# Patient Record
Sex: MALE | URBAN - METROPOLITAN AREA
[De-identification: names, ages, dates, MRNs, and addresses within clinical notes are randomized per-mention and may not be internally consistent; named-entity substitution may affect disease eponyms.]

---

## 2023-11-01 ENCOUNTER — APPOINTMENT (OUTPATIENT)
Dept: URBAN - METROPOLITAN AREA CLINIC 204 | Age: 68
Setting detail: DERMATOLOGY
End: 2023-11-02

## 2023-11-01 PROCEDURE — 17000 DESTRUCT PREMALG LESION: CPT

## 2023-11-01 PROCEDURE — OTHER MIPS QUALITY: OTHER

## 2023-11-01 PROCEDURE — 99213 OFFICE O/P EST LOW 20 MIN: CPT | Mod: 25

## 2023-11-01 PROCEDURE — 17003 DESTRUCT PREMALG LES 2-14: CPT

## 2025-01-20 ENCOUNTER — EVALUATION (OUTPATIENT)
Dept: PHYSICAL THERAPY | Facility: CLINIC | Age: 70
End: 2025-01-20
Payer: MEDICARE

## 2025-01-20 DIAGNOSIS — S83.91XA SPRAIN OF UNSPECIFIED SITE OF RIGHT KNEE, INITIAL ENCOUNTER: ICD-10-CM

## 2025-01-20 PROCEDURE — 97161 PT EVAL LOW COMPLEX 20 MIN: CPT | Mod: GP

## 2025-01-20 PROCEDURE — 97110 THERAPEUTIC EXERCISES: CPT | Mod: GP

## 2025-01-20 ASSESSMENT — PAIN SCALES - GENERAL: PAINLEVEL_OUTOF10: 2

## 2025-01-20 ASSESSMENT — ENCOUNTER SYMPTOMS
LOSS OF SENSATION IN FEET: 0
DEPRESSION: 0
OCCASIONAL FEELINGS OF UNSTEADINESS: 0

## 2025-01-20 ASSESSMENT — PATIENT HEALTH QUESTIONNAIRE - PHQ9
SUM OF ALL RESPONSES TO PHQ9 QUESTIONS 1 AND 2: 0
1. LITTLE INTEREST OR PLEASURE IN DOING THINGS: NOT AT ALL
2. FEELING DOWN, DEPRESSED OR HOPELESS: NOT AT ALL

## 2025-01-20 ASSESSMENT — ACTIVITIES OF DAILY LIVING (ADL): EFFECT OF PAIN ON DAILY ACTIVITIES: SEE GOALS

## 2025-01-20 ASSESSMENT — PAIN - FUNCTIONAL ASSESSMENT: PAIN_FUNCTIONAL_ASSESSMENT: 0-10

## 2025-01-20 NOTE — Clinical Note
2025    David Echeverria MD  [address] [facility] [address]    Patient: Stefany Mullen   YOB: 1955   Date of Visit: 2025       Dear David Echeverria MD  [address] [facility] [place] [address]    The attached plan of care is being sent to you because your patient’s medical reimbursement requires that you certify the plan of care. Your signature is required to allow uninterrupted insurance coverage.      You may indicate your approval by signing below and faxing this form back to us at Dept Fax: [phone].    Please call Dept: [phone] with any questions or concerns.    Thank you for this referral,        Americo Hodges, PT  [address] [address]    Payer: Payor: MEDICARE / Plan: MEDICARE PART A AND B / Product Type: *No Product type* /                                                                         Date:     Dear Americo Hodges, PT,     Re: Mr. Stefany Mullen, MRN:[id number]    I certify that I have reviewed the attached plan of care and it is medically necessary for Mr. Stefany Mullen (1955) who is under my care.          ______________________________________                    _________________  Provider name and credentials                                           Date and time                                                                                           Plan of Care 25   Effective from: 2025  Effective to: 2025    Plan ID: [id number]            Participants as of Finalize on 2025    Name Type Comments Contact Info    David Echeverria MD Referring Provider  [phone]    Americo Hodges PT Physical Therapist  [phone]       Last Plan Note     Author: Americo Hodges PT Status: Signed Last edited: 2025  7:00 AM       Patient Name: Stefany Mullen  MRN: [id number]  Today's Date: 2025  : 1955  David Echeverria  Time Calculation  Start  Time: 0704  Stop Time: 0745  Time Calculation (min): 41 min    PT Evaluation Time Entry  PT Evaluation (Low) Time Entry: 30   PT Therapeutic Procedures Time Entry  Therapeutic Exercise Time Entry: 12                         Assessment:  Rehab Prognosis: Good  Barriers to Participation:  (none)    Plan:  Treatment/Interventions: Aquatic therapy, Cryotherapy, Education/ Instruction, Electrical stimulation, Gait training, Hot pack, Manual therapy, Self care/ home management, Therapeutic exercises, Other (comment) (IASTM/cupping)  PT Plan: Skilled PT  PT Frequency: 2 times per week  Duration: 4wks  Onset Date: 10/09/24  Certification Period Start Date: 01/20/25  Certification Period End Date: 04/20/25  Rehab Potential: Good  Plan of Care Agreement: Patient    Current Problem:   1. 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    Physical Findings*: Limited: AROM ( R knee )                                                Strength ( R knee )                                                  POC:  Ongoing clinic PT to include: continue with open to closed chain R knee ROM?PRE as amanda    Other: (copay*?- no ) (fall risk*?-  no ) (ins visit limit*?-no   )      Precautions:  Precautions  STEADI Fall Risk Score (The score of 4 or more indicates an increased risk of falling): 1  Precautions Comment: R/L TKR (last in 2020)    Pain:  Pain Assessment  Pain Assessment: 0-10  0-10 (Numeric) Pain Score: 2  Pain Location: Knee  Pain Orientation: Right  Pain Onset:  (10/9/24 injury while walking on  "stairs)  Effect of Pain on Daily Activities: see goals  Pain Interventions:  (flms taken)    Prior Level of Function:  Prior Function Per Pt/Caregiver Report  Vocational: Retired    Objective     Outcome Measures:  Other Measures  Lower Extremity Funtional Score (LEFS): 45     Treatments:  L 1/2 heel lift application  R stdg TKE10 vy  POC:  Ongoing clinic PT to include: continue with open to closed chain R knee ROM?PRE as amanda  OP EDUCATION:  Heel lift  Access Code: [email or web address]  URL: [email or web address]  Date: 01/20/2025  Prepared by: Vj Hodges    Exercises  - Standing Terminal Knee Extension with Resistance   Goals:  Active       PT Problem       PT Goal 1       Start:  01/20/25    Expected End:  04/20/25       1. Independent HEP to allow for 50% reduction in max ADL C/C sx ( 2/10) 2-3wks  2. Survey score improvement from 45/80 to 55/80 (LEFS) 3-4wks  3. ROM increase to allow for improved ADL stairs (from 5-110 to neut to 120 R knee) 3-4wks           PT Goal 2       Start:  01/20/25    Expected End:  04/20/25       1. \"I want my  knee  to feel better\".              KNEE        Knee Observation  Observation Comment: R>L leg length         Knee AROM WFL unless documented below  R knee flexion: (140°): 110 (pain)  L knee flexion: (140°): 120  R knee extension: (0°): -5  L knee extension: (0°): neut  Lower Extremity Strength:WFL unless documented  MMT 5/5 max  RIGHT LEFT   Hip Flexion    5 /5    5 /5   Hip Extension    5 /5    5 /5   Hip Abduction     5/5    5 /5        Knee Extension    5 /5     5/5   Knee Flexion     5/5     5/5                                           Current Participants as of 1/20/2025    Name Type Comments Contact Info    David Echeverria MD Referring Provider  [phone]    Signature pending    Americo Hodges, PT Physical Therapist  [phone]          "

## 2025-01-20 NOTE — PROGRESS NOTES
Patient Name: Stefany Mullen  MRN: [id number]  Today's Date: 2025  : 1955  David Echeverria  Time Calculation  Start Time: 704  Stop Time: 745  Time Calculation (min): 41 min    PT Evaluation Time Entry  PT Evaluation (Low) Time Entry: 30   PT Therapeutic Procedures Time Entry  Therapeutic Exercise Time Entry: 12                         Assessment:  Rehab Prognosis: Good  Barriers to Participation:  (none)    Plan:  Treatment/Interventions: Aquatic therapy, Cryotherapy, Education/ Instruction, Electrical stimulation, Gait training, Hot pack, Manual therapy, Self care/ home management, Therapeutic exercises, Other (comment) (IASTM/cupping)  PT Plan: Skilled PT  PT Frequency: 2 times per week  Duration: 4wks  Onset Date: 10/09/24  Certification Period Start Date: 25  Certification Period End Date: 25  Rehab Potential: Good  Plan of Care Agreement: Patient    Current Problem:   1. 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    Physical Findings*: Limited: AROM ( R knee )                                                Strength ( R knee )                                                  POC:  Ongoing clinic PT to include: continue with open to closed chain R knee ROM?PRE as amanda    Other: (copay*?- no ) (fall risk*?-  no ) (ins visit limit*?-no   )      Precautions:  Precautions  STEADI Fall Risk Score (The score of 4 or more indicates an increased risk of falling): 1  Precautions Comment: R/L TKR (last in )    Pain:  Pain Assessment  Pain Assessment:  "0-10  0-10 (Numeric) Pain Score: 2  Pain Location: Knee  Pain Orientation: Right  Pain Onset:  (10/9/24 injury while walking on stairs)  Effect of Pain on Daily Activities: see goals  Pain Interventions:  (flms taken)    Prior Level of Function:  Prior Function Per Pt/Caregiver Report  Vocational: Retired    Objective     Outcome Measures:  Other Measures  Lower Extremity Funtional Score (LEFS): 45     Treatments:  L 1/2 heel lift application  R stdg TKE10 vy  POC:  Ongoing clinic PT to include: continue with open to closed chain R knee ROM?PRE as amanda  OP EDUCATION:  Heel lift  Access Code: [email or web address]  URL: [email or web address]  Date: 01/20/2025  Prepared by: Vj Hodges    Exercises  - Standing Terminal Knee Extension with Resistance   Goals:  Active       PT Problem       PT Goal 1       Start:  01/20/25    Expected End:  04/20/25       1. Independent HEP to allow for 50% reduction in max ADL C/C sx ( 2/10) 2-3wks  2. Survey score improvement from 45/80 to 55/80 (LEFS) 3-4wks  3. ROM increase to allow for improved ADL stairs (from 5-110 to neut to 120 R knee) 3-4wks           PT Goal 2       Start:  01/20/25    Expected End:  04/20/25       1. \"I want my  knee  to feel better\".              KNEE        Knee Observation  Observation Comment: R>L leg length         Knee AROM WFL unless documented below  R knee flexion: (140°): 110 (pain)  L knee flexion: (140°): 120  R knee extension: (0°): -5  L knee extension: (0°): neut  Lower Extremity Strength:WFL unless documented  MMT 5/5 max  RIGHT LEFT   Hip Flexion    5 /5    5 /5   Hip Extension    5 /5    5 /5   Hip Abduction     5/5    5 /5        Knee Extension    5 /5     5/5   Knee Flexion     5/5     5/5                                    "

## 2025-01-22 ENCOUNTER — TREATMENT (OUTPATIENT)
Dept: PHYSICAL THERAPY | Facility: CLINIC | Age: 70
End: 2025-01-22
Payer: MEDICARE

## 2025-01-22 DIAGNOSIS — S83.91XA SPRAIN OF UNSPECIFIED SITE OF RIGHT KNEE, INITIAL ENCOUNTER: ICD-10-CM

## 2025-01-22 PROCEDURE — 97140 MANUAL THERAPY 1/> REGIONS: CPT | Mod: GP,CQ

## 2025-01-22 PROCEDURE — 97110 THERAPEUTIC EXERCISES: CPT | Mod: GP,CQ

## 2025-01-22 ASSESSMENT — PAIN SCALES - GENERAL: PAINLEVEL_OUTOF10: 4

## 2025-01-22 ASSESSMENT — PAIN - FUNCTIONAL ASSESSMENT: PAIN_FUNCTIONAL_ASSESSMENT: 0-10

## 2025-01-22 NOTE — PROGRESS NOTES
Physical Therapy Treatment    Patient Name: Stefany Mullen  MRN: [id number]  Today's Date: 1/22/2025  Time Calculation  Start Time: 0745  Stop Time: 0828  Time Calculation (min): 43 min  PT Therapeutic Procedures Time Entry  Manual Therapy Time Entry: 15  Therapeutic Exercise Time Entry: 26       Assessment:   Patient able to tolerate new PRE's with good form and mild challenges noted. Patient tolerates seated resisted exercises with no exacerbation of symptoms. Demo's restriction in R IT band, but responds well to STM. Patient demo's improvements in symptoms pre and post session.    Plan:  Continue to work on improving strength and decreasing pain to be able to tolerate prolonged standing with little to no difficulty. -AB.     OP PT Plan  Treatment/Interventions: Aquatic therapy, Cryotherapy, Education/ Instruction, Electrical stimulation, Gait training, Hot pack, Manual therapy, Self care/ home management, Therapeutic exercises, Other (comment) (IASTM/cupping)  PT Plan: Skilled PT  PT Frequency: 2 times per week  Duration: 4wks  Onset Date: 10/09/24  Certification Period Start Date: 01/20/25  Certification Period End Date: 04/20/25  Rehab Potential: Good  Plan of Care Agreement: Patient    Current Problem  Problem List Items Addressed This Visit             ICD-10-CM    Sprain of unspecified site of right knee, initial encounter S83.91XA       Subjective   General  Reason for Referral: R knee sprain  Referred By: Juwan  General Comment: 2/9  Visit: 2  HEP: Yes  Patient states that he isn't sure if the exercise that he is doing is putting pressure on his knee or not. States that he is having pain around the knee cap. Patient states that he got a heel lift at his last appt and states that he took it apart and is using a smaller lift for now until his body can adjust.     Precautions  Precautions  Precautions Comment: R/L TKR (last in 2020)    Pain  Pain Assessment: 0-10  0-10 (Numeric) Pain Score: 4  Pain Location:  "Knee  Pain Orientation: Right    Objective     Treatments:  Therapeutic Exercise: 26 minutes, 2 units  SciFit x5' (N)  Slantboard 2x1' (N)  Step ups 6\" step 2x10 R leading Fwd/Lateral (N)  Standing heel raises 2x10 Bilat (N)  Seated LAQ with ball squeeze 2x10 R (N)  Seated hip abduction 2x10 [place] Tband (N)  Seated marches 2x10 [place] Tband (N)  L 1/2 heel lift application (X)  R stdg TKE10 vy    Manual Therapy: 15 minutes, 1 units  STM to R distal IT band, quad, HS (N)    POC:  Ongoing clinic PT to include: continue with open to closed chain R knee ROM?PRE as amanda    OP EDUCATION:   Heel lift  Access Code: [email or web address]  URL: [email or web address]  Date: 01/20/2025  Prepared by: Vj Hodges    Exercises  - Standing Terminal Knee Extension with Resistance     Goals:  Active       PT Problem       PT Goal 1       Start:  01/20/25    Expected End:  04/20/25       1. Independent HEP to allow for 50% reduction in max ADL C/C sx ( 2/10) 2-3wks  2. Survey score improvement from 45/80 to 55/80 (LEFS) 3-4wks  3. ROM increase to allow for improved ADL stairs (from 5-110 to neut to 120 R knee) 3-4wks           PT Goal 2       Start:  01/20/25    Expected End:  04/20/25       1. \"I want my  knee  to feel better\".             "

## 2025-01-27 ENCOUNTER — TREATMENT (OUTPATIENT)
Dept: PHYSICAL THERAPY | Facility: CLINIC | Age: 70
End: 2025-01-27
Payer: MEDICARE

## 2025-01-27 DIAGNOSIS — S83.91XA SPRAIN OF UNSPECIFIED SITE OF RIGHT KNEE, INITIAL ENCOUNTER: ICD-10-CM

## 2025-01-27 PROCEDURE — 97140 MANUAL THERAPY 1/> REGIONS: CPT | Mod: GP,CQ

## 2025-01-27 PROCEDURE — 97110 THERAPEUTIC EXERCISES: CPT | Mod: GP,CQ

## 2025-01-27 ASSESSMENT — PAIN - FUNCTIONAL ASSESSMENT: PAIN_FUNCTIONAL_ASSESSMENT: 0-10

## 2025-01-27 ASSESSMENT — PAIN SCALES - GENERAL: PAINLEVEL_OUTOF10: 2

## 2025-01-27 NOTE — PROGRESS NOTES
Physical Therapy Treatment    Patient Name: Stefany Mullen  MRN: [id number]  Today's Date: 1/27/2025  Time Calculation  Start Time: 0658  Stop Time: 0743  Time Calculation (min): 45 min  PT Therapeutic Procedures Time Entry  Manual Therapy Time Entry: 12  Therapeutic Exercise Time Entry: 31       Assessment:   Patient identified by name and date of birth. Patient was able to progress with ROM and strengthening w/o increased Sx. He presented with palpable tension in R IT and HS added cupping this date patient demonstrated good tolerance with reduction of Sx noted after.     OBJECTIVE   Patient was able to perform previously issued exercise for HEP with 25% cueing for correct technique.   Plan:  OP PT Plan  Treatment/Interventions: Aquatic therapy, Cryotherapy, Education/ Instruction, Electrical stimulation, Gait training, Hot pack, Manual therapy, Self care/ home management, Therapeutic exercises, Other (comment) (IASTM/cupping)  PT Plan: Skilled PT  PT Frequency: 2 times per week  Duration: 4wks  Onset Date: 10/09/24  Certification Period Start Date: 01/20/25  Certification Period End Date: 04/20/25  Rehab Potential: Good  Plan of Care Agreement: Patient  Continue with ROM and strengthening of R knee with manual as needed for reduction of Sx and increased ease with ambulation with job duties.   Current Problem  Problem List Items Addressed This Visit             ICD-10-CM    Sprain of unspecified site of right knee, initial encounter S83.91XA       Subjective Patient reported compliance with % of the time and verbalized understanding. He reported he experienced sharp pain in his knee and the lateral side of his LE. He reported no pain with standing that increased with ambulation out of clinic area.   General  Reason for Referral: R knee sprain  Referred By: Juwan  General Comment: 3/9  Precautions  Precautions  Precautions Comment: R/L TKR (last in 2020)    Pain  Pain Assessment: 0-10  0-10 (Numeric) Pain Score:  "2  Pain Location: Knee  Pain Orientation: Right     Treatments:  Therapeutic Exercise  Therapeutic Exercise Performed: Yes  SciFit x5'   Slantboard 2x1'   Step ups 6\" step 2x10 R leading Fwd/Lateral   Standing heel raises 2x10 Bilat   Standing hip abd,hip ext 2 x 10 (N)  R stdg TKE 2 x 10 blue  Seated LAQ with ball squeeze 2x10 R   Seated hip abduction 2x10 blue Tband   Seated marches 2x10  blue Tband   Seated HSC with blue band 2 x 10 (N)  Seated HS stretch 2 x 20\" (N)  IT band stretch with strap x5 10\" hold (N)  L 1/2 heel lift application (X)     Manual Therapy:   STM to R distal IT band, quad, HS  Cupping to R IT band static and dynamic (N)      OP EDUCATION:  Access Code: [email or web address]  URL: [email or web address]  Date: 01/27/2025  Prepared by: Chante Ramirez    Exercises  - Seated Long Arc Quad with Hip Adduction  - 1 x daily - 7 x weekly - 2 sets - 10 reps  - Seated Hip Abduction with Resistance  - 1 x daily - 7 x weekly - 2 sets - 10 reps  - Seated March with Resistance  - 1 x daily - 7 x weekly - 2 sets - 10 reps  - Seated Hamstring Stretch  - 1 x daily - 7 x weekly - 1 sets - 3 reps - 20-30 hold  - Supine ITB Stretch with Strap  - 1 x daily - 7 x weekly - 1 sets - 10 reps - 10 hold*   Heel lift  Access Code: [email or web address]  URL: [email or web address]  Date: 01/20/2025  Prepared by: Vj Hodges     Exercises  - Standing Terminal Knee Extension with Resistance     Goals:  Active       PT Problem       PT Goal 1       Start:  01/20/25    Expected End:  04/20/25       1. Independent HEP to allow for 50% reduction in max ADL C/C sx ( 2/10) 2-3wks  2. Survey score improvement from 45/80 to 55/80 (LEFS) 3-4wks  3. ROM increase to allow for improved ADL stairs (from 5-110 to neut to 120 R knee) 3-4wks           PT Goal 2       Start:  01/20/25    Expected End:  04/20/25       1. \"I want my  knee  to feel better\".             "

## 2025-01-29 ENCOUNTER — TREATMENT (OUTPATIENT)
Dept: PHYSICAL THERAPY | Facility: CLINIC | Age: 70
End: 2025-01-29
Payer: MEDICARE

## 2025-01-29 DIAGNOSIS — S83.91XA SPRAIN OF UNSPECIFIED SITE OF RIGHT KNEE, INITIAL ENCOUNTER: ICD-10-CM

## 2025-01-29 PROCEDURE — 97110 THERAPEUTIC EXERCISES: CPT | Mod: GP,CQ

## 2025-01-29 PROCEDURE — 97140 MANUAL THERAPY 1/> REGIONS: CPT | Mod: GP,CQ

## 2025-01-29 ASSESSMENT — PAIN SCALES - GENERAL: PAINLEVEL_OUTOF10: 5 - MODERATE PAIN

## 2025-01-29 ASSESSMENT — PAIN - FUNCTIONAL ASSESSMENT: PAIN_FUNCTIONAL_ASSESSMENT: 0-10

## 2025-01-29 NOTE — PROGRESS NOTES
"Physical Therapy Treatment    Patient Name: Stefany Mullen  MRN: [id number]  Today's Date: 1/29/2025  Time Calculation  Start Time: 0700  Stop Time: 0744  Time Calculation (min): 44 min  PT Therapeutic Procedures Time Entry  Manual Therapy Time Entry: 10  Therapeutic Exercise Time Entry: 30       Assessment:   Patient identified by name and date of birth. Patient was able to progress with strengthening this date with addition of mini squats. He presented with tension in IT band, instructed with use of roller for HEP he verbalized good understanding.     OBJECTIVE     Plan:  OP PT Plan  Treatment/Interventions: Aquatic therapy, Cryotherapy, Education/ Instruction, Electrical stimulation, Gait training, Hot pack, Manual therapy, Self care/ home management, Therapeutic exercises, Other (comment) (IASTM/cupping)  PT Plan: Skilled PT  PT Frequency: 2 times per week  Duration: 4wks  Onset Date: 10/09/24  Certification Period Start Date: 01/20/25  Certification Period End Date: 04/20/25  Rehab Potential: Good  Plan of Care Agreement: Patient  Continue with progression of ROM and strengthening to increase with standing and driving.   Current Problem  Problem List Items Addressed This Visit             ICD-10-CM    Sprain of unspecified site of right knee, initial encounter S83.91XA       Subjective Patient reported compliance with % of the time and verbalized understanding.  Patient reported he has experienced increased soreness the past two days. He reported 0/10 pain after treatment.   General  Reason for Referral: R knee sprain  Referred By: Juwan  General Comment: 4/9  Precautions  Precautions  Precautions Comment: R/L TKR (last in 2020)    Pain  Pain Assessment: 0-10  0-10 (Numeric) Pain Score: 5 - Moderate pain  Pain Location: Knee  Pain Orientation: Right     Treatments:  Therapeutic Exercise  Therapeutic Exercise Performed: Yes  SciFit x5'   Slantboard 2x1'   Step ups 6\" step 2x10 R leading Fwd/Lateral   Standing " "heel raises 2x10 Bilat   Standing hip abd,hip ext 2 x 10 on Airex one UE support  Mini Squats 2 x 10 (N)  R stdg TKE 2 x 10 blue  Seated LAQ with ball squeeze 2x10 R   Seated hip abduction 2x10 blue Tband   Seated marches 2x10  blue Tband   Seated HSC with blue band 2 x 10  Seated HS stretch 2 x 20\"   IT band stretch with strap x5 10\" hold   L 1/2 heel lift application (X)     Manual Therapy:   STM to R distal IT band, quad, HS  Cupping to R IT band static and dynamic (X)     OP EDUCATION:  Access Code: [email or web address]  URL: [email or web address]  Date: 01/27/2025  Prepared by: Chante Ramirez     Exercises  - Seated Long Arc Quad with Hip Adduction  - 1 x daily - 7 x weekly - 2 sets - 10 reps  - Seated Hip Abduction with Resistance  - 1 x daily - 7 x weekly - 2 sets - 10 reps  - Seated March with Resistance  - 1 x daily - 7 x weekly - 2 sets - 10 reps  - Seated Hamstring Stretch  - 1 x daily - 7 x weekly - 1 sets - 3 reps - 20-30 hold  - Supine ITB Stretch with Strap  - 1 x daily - 7 x weekly - 1 sets - 10 reps - 10 hold*   Heel lift  Access Code: [email or web address]  URL: [email or web address]  Date: 01/20/2025  Prepared by: Vj Hodges    Goals:  Active       PT Problem       PT Goal 1       Start:  01/20/25    Expected End:  04/20/25       1. Independent HEP to allow for 50% reduction in max ADL C/C sx ( 2/10) 2-3wks  2. Survey score improvement from 45/80 to 55/80 (LEFS) 3-4wks  3. ROM increase to allow for improved ADL stairs (from 5-110 to neut to 120 R knee) 3-4wks           PT Goal 2       Start:  01/20/25    Expected End:  04/20/25       1. \"I want my  knee  to feel better\".             "

## 2025-02-03 ENCOUNTER — TREATMENT (OUTPATIENT)
Dept: PHYSICAL THERAPY | Facility: CLINIC | Age: 70
End: 2025-02-03
Payer: MEDICARE

## 2025-02-03 DIAGNOSIS — S83.91XA SPRAIN OF UNSPECIFIED SITE OF RIGHT KNEE, INITIAL ENCOUNTER: ICD-10-CM

## 2025-02-03 PROCEDURE — 97110 THERAPEUTIC EXERCISES: CPT | Mod: GP,CQ

## 2025-02-03 PROCEDURE — 97140 MANUAL THERAPY 1/> REGIONS: CPT | Mod: GP,CQ

## 2025-02-03 ASSESSMENT — PAIN - FUNCTIONAL ASSESSMENT: PAIN_FUNCTIONAL_ASSESSMENT: 0-10

## 2025-02-03 ASSESSMENT — PAIN SCALES - GENERAL: PAINLEVEL_OUTOF10: 3

## 2025-02-03 NOTE — PROGRESS NOTES
"Physical Therapy Treatment    Patient Name: Stefany Mullen  MRN: [id number]  Today's Date: 2/3/2025  Time Calculation  Start Time: 0701  Stop Time: 0745  Time Calculation (min): 44 min  PT Therapeutic Procedures Time Entry  Manual Therapy Time Entry: 9  Therapeutic Exercise Time Entry: 33       Assessment:   Patient identified by name and date of birth. Reviewed patients report of increased Sx with PT he assessed patient, will proceed with treatment as tolerates. Held progression of reps/strengthening due to increased Sx after last treatment.     OBJECTIVE     Plan:  OP PT Plan  Treatment/Interventions: Aquatic therapy, Cryotherapy, Education/ Instruction, Electrical stimulation, Gait training, Hot pack, Manual therapy, Self care/ home management, Therapeutic exercises, Other (comment) (IASTM/cupping)  PT Plan: Skilled PT  PT Frequency: 2 times per week  Duration: 4wks  Onset Date: 10/09/24  Certification Period Start Date: 01/20/25  Certification Period End Date: 04/20/25  Rehab Potential: Good  Plan of Care Agreement: Patient  Progress with ROM and strengthening for increased ease with ambulation and sitting tolerance.   Current Problem  Problem List Items Addressed This Visit             ICD-10-CM    Sprain of unspecified site of right knee, initial encounter S83.91XA       Subjective Patient reported compliance with % of the time and verbalized understanding.  Patient reported after his last treatment he went to work and experienced increased pain in his calf/HS and his knee \"locked up\". He reported overall more soreness in his knee since the start of treatment. He reported 2/10 pain after treatment.   General  Reason for Referral: R knee sprain  Referred By: Juwan  General Comment: 5/9  Precautions  Precautions  Precautions Comment: R/L TKR (last in 2020)    Pain  Pain Assessment: 0-10  0-10 (Numeric) Pain Score: 3  Pain Type: Chronic pain  Pain Location: Knee  Pain Orientation: Right     " "Treatments:  Therapeutic Exercise  Therapeutic Exercise Performed: Yes  SciFit x5'   Slantboard 2x1'   Step ups 6\" step 2x10 R leading Fwd/Lateral   Standing heel raises 2x10 Bilat   Standing hip abd,hip ext 2 x 10 on Airex one UE support  Mini Squats 2 x 10 (N)  R stdg TKE 2 x 10 blue  Seated LAQ with ball squeeze 2x10 R   Seated hip abduction 2x10 blue Tband   Seated marches 2x10  blue Tband (X)  Seated HSC with blue band 2 x 10  Seated HS stretch 2 x 20\"   IT band stretch with strap x5 10\" hold   L 1/2 heel lift application (X)     Manual Therapy:   STM to R distal IT band, quad, HS  Cupping to R IT band static and dynamic (X)     OP EDUCATION:  Access Code: [email or web address]  URL: [email or web address]  Date: 01/27/2025  Prepared by: Chante Ramirez     Exercises  - Seated Long Arc Quad with Hip Adduction  - 1 x daily - 7 x weekly - 2 sets - 10 reps  - Seated Hip Abduction with Resistance  - 1 x daily - 7 x weekly - 2 sets - 10 reps  - Seated March with Resistance  - 1 x daily - 7 x weekly - 2 sets - 10 reps  - Seated Hamstring Stretch  - 1 x daily - 7 x weekly - 1 sets - 3 reps - 20-30 hold  - Supine ITB Stretch with Strap  - 1 x daily - 7 x weekly - 1 sets - 10 reps - 10 hold*   Heel lift  Access Code: [email or web address]  URL: [email or web address]  Date: 01/20/2025  Prepared by: Vj Hodges       Goals:  Active       PT Problem       PT Goal 1       Start:  01/20/25    Expected End:  04/20/25       1. Independent HEP to allow for 50% reduction in max ADL C/C sx ( 2/10) 2-3wks  2. Survey score improvement from 45/80 to 55/80 (LEFS) 3-4wks  3. ROM increase to allow for improved ADL stairs (from 5-110 to neut to 120 R knee) 3-4wks           PT Goal 2       Start:  01/20/25    Expected End:  04/20/25       1. \"I want my  knee  to feel better\".             "

## 2025-02-05 ENCOUNTER — APPOINTMENT (OUTPATIENT)
Dept: PHYSICAL THERAPY | Facility: CLINIC | Age: 70
End: 2025-02-05
Payer: MEDICARE

## 2025-02-05 DIAGNOSIS — S83.91XA SPRAIN OF UNSPECIFIED SITE OF RIGHT KNEE, INITIAL ENCOUNTER: ICD-10-CM

## 2025-02-10 ENCOUNTER — APPOINTMENT (OUTPATIENT)
Dept: PHYSICAL THERAPY | Facility: CLINIC | Age: 70
End: 2025-02-10
Payer: MEDICARE

## 2025-02-10 DIAGNOSIS — S83.91XA SPRAIN OF UNSPECIFIED SITE OF RIGHT KNEE, INITIAL ENCOUNTER: ICD-10-CM

## 2025-02-12 ENCOUNTER — APPOINTMENT (OUTPATIENT)
Dept: PHYSICAL THERAPY | Facility: CLINIC | Age: 70
End: 2025-02-12
Payer: MEDICARE

## 2025-02-12 DIAGNOSIS — S83.91XA SPRAIN OF UNSPECIFIED SITE OF RIGHT KNEE, INITIAL ENCOUNTER: ICD-10-CM

## 2025-02-17 ENCOUNTER — APPOINTMENT (OUTPATIENT)
Dept: PHYSICAL THERAPY | Facility: CLINIC | Age: 70
End: 2025-02-17
Payer: MEDICARE

## 2025-02-17 DIAGNOSIS — S83.91XA SPRAIN OF UNSPECIFIED SITE OF RIGHT KNEE, INITIAL ENCOUNTER: ICD-10-CM

## 2025-03-12 ENCOUNTER — EVALUATION (OUTPATIENT)
Dept: PHYSICAL THERAPY | Facility: CLINIC | Age: 70
End: 2025-03-12
Payer: MEDICARE

## 2025-03-12 DIAGNOSIS — S83.91XA SPRAIN OF UNSPECIFIED SITE OF RIGHT KNEE, INITIAL ENCOUNTER: ICD-10-CM

## 2025-03-12 PROCEDURE — 97110 THERAPEUTIC EXERCISES: CPT | Mod: GP

## 2025-03-12 ASSESSMENT — PAIN SCALES - GENERAL: PAINLEVEL_OUTOF10: 7

## 2025-03-12 ASSESSMENT — PAIN - FUNCTIONAL ASSESSMENT: PAIN_FUNCTIONAL_ASSESSMENT: 0-10

## 2025-03-12 NOTE — PROGRESS NOTES
"Physical Therapy Treatment    Patient Name: Stefany Mullen  MRN: [id number]  : 1955  Today's Date: 3/12/2025  David Echeverria  Time Calculation  Start Time: 833  Stop Time: 915  Time Calculation (min): 42 min      PT Therapeutic Procedures Time Entry  Therapeutic Exercise Time Entry: 40                         General:  General  Reason for Referral: R knee sprain  Referred By: Juwan  General Comment:   Visit #6    Current Problem  Problem List Items Addressed This Visit             ICD-10-CM    Sprain of unspecified site of right knee, initial encounter S83.91XA         Assessment:Patient identity confirmed today with name/. ;  ( 1 ) falls since last clinic visit-fell and dislocated his L shldr (treated at the ER); no changes in knee management since last clinic visit;  the patient reports back after last being seen 2/3/25; no additions today      Plan:  Treatment/Interventions: Aquatic therapy, Cryotherapy, Education/ Instruction, Electrical stimulation, Gait training, Hot pack, Manual therapy, Self care/ home management, Therapeutic exercises, Other (comment) (IASTM/cupping)  PT Plan: Skilled PT  PT Frequency: 2 times per week  Duration: 4wks  Onset Date: 10/09/24  Certification Period Start Date: 25  Certification Period End Date: 06/10/25  Rehab Potential: Fair (previous rehab series)  Plan of Care Agreement: Patient  Will reinitiated knee rehab-2x/wk x 4wks    Subjective: I have  0 /10 pain right now.         Precautions  Precautions  Precautions Comment: R/L TKR (last in ); R shldr disloc 3wks ago      Pain  Pain Assessment: 0-10  0-10 (Numeric) Pain Score: 7 (max sx within the last 5 days)  Pain Location: Knee  Pain Orientation: Right    Objective    There ex:___40__min  SciFit x5'   Slantboard 3x30\"   Step ups 6\" step 2x10 R leading Fwd/Lateral   Standing heel raises 2x10 Bilat   Standing L hip abd,hip ext 2 x 10 on Airex one UE support  Mini Squats 2 x 10 on airex 2x10 1 UE support  R " "stdg TKE 2 x 10 purple P  Seated LAQ with ball squeeze 2x10 R 3# P  Seated hip abduction 2x10 purple Tband P  Seated marches 2x10  blue Tband (X)  Seated HSC with blue band 2 x 10 (manual today)  Seated HS stretch 2 x 20\"   IT band stretch with strap x5 10\" hold (no time)  L 1/2 heel lift application (X)     Manual Therapy:   STM to R distal IT band, quad, HS X  Cupping to R IT band static and dynamic (X)     OP EDUCATION:  Access Code: [email or web address]  URL: [email or web address]  Date: 01/27/2025  Prepared by: Chante Ramirez     Exercises  - Seated Long Arc Quad with Hip Adduction  - 1 x daily - 7 x weekly - 2 sets - 10 reps  - Seated Hip Abduction with Resistance  - 1 x daily - 7 x weekly - 2 sets - 10 reps  - Seated March with Resistance  - 1 x daily - 7 x weekly - 2 sets - 10 reps  - Seated Hamstring Stretch  - 1 x daily - 7 x weekly - 1 sets - 3 reps - 20-30 hold  - Supine ITB Stretch with Strap  - 1 x daily - 7 x weekly - 1 sets - 10 reps - 10 hold*   Heel lift  Access Code: [email or web address]  URL: [email or web address]  Date: 01/20/2025  Prepared by: Vj Hodges          Goals:  Active       PT Problem       PT Goal 1       Start:  01/20/25    Expected End:  04/20/25       1. Independent HEP to allow for 50% reduction in max ADL C/C sx ( 2/10) 2-3wks  2. Survey score improvement from 55/80 to 65/80 (LEFS) 3-4wks  3. ROM increase to allow for improved ADL stairs (from 5-110 to neut to 120 R knee) 3-4wks           PT Goal 2       Start:  01/20/25    Expected End:  04/20/25       1. \"I want my  knee  to feel better\".             "

## 2025-03-17 ENCOUNTER — TREATMENT (OUTPATIENT)
Dept: PHYSICAL THERAPY | Facility: CLINIC | Age: 70
End: 2025-03-17
Payer: MEDICARE

## 2025-03-17 DIAGNOSIS — S83.91XA SPRAIN OF UNSPECIFIED SITE OF RIGHT KNEE, INITIAL ENCOUNTER: ICD-10-CM

## 2025-03-17 PROCEDURE — 97110 THERAPEUTIC EXERCISES: CPT | Mod: GP

## 2025-03-17 ASSESSMENT — PAIN SCALES - GENERAL: PAINLEVEL_OUTOF10: 0 - NO PAIN

## 2025-03-17 ASSESSMENT — PAIN - FUNCTIONAL ASSESSMENT: PAIN_FUNCTIONAL_ASSESSMENT: 0-10

## 2025-03-17 NOTE — PROGRESS NOTES
"Physical Therapy Treatment    Patient Name: Stefany Mullen  MRN: [id number]  : 1955  Today's Date: 3/17/2025  David Echeverria  Time Calculation  Start Time: 831  Stop Time: 909  Time Calculation (min): 38 min      PT Therapeutic Procedures Time Entry  Therapeutic Exercise Time Entry: 38                         General:  General  Reason for Referral: R knee sprain  Referred By: Juwan  General Comment:   Visit #7    Current Problem  Problem List Items Addressed This Visit             ICD-10-CM    Sprain of unspecified site of right knee, initial encounter S83.91XA         Assessment:Patient identity confirmed today with name/. ;  ( 0 ) falls since last clinic visit;  no C/O pain with today's exercise additions      Plan:  Treatment/Interventions: Aquatic therapy, Cryotherapy, Education/ Instruction, Electrical stimulation, Gait training, Hot pack, Manual therapy, Self care/ home management, Therapeutic exercises, Other (comment) (IASTM/cupping)  PT Plan: Skilled PT  PT Frequency: 2 times per week  Duration: 4wks  Onset Date: 10/09/24  Certification Period Start Date: 25  Certification Period End Date: 06/10/25  Rehab Potential: Fair (previous rehab series)  Plan of Care Agreement: Patient  Continue with clinic rehab treatments toward functional goals ( gait)    Subjective: I have  0 /10 pain right now.   I think I have turned a corner with the knee.      Precautions  Precautions  Precautions Comment: R/L TKR (last in ); R shldr disloc 3wks ago      Pain  Pain Assessment: 0-10  0-10 (Numeric) Pain Score: 0 - No pain  Pain Location: Knee  Pain Orientation: Right    Objective    Manual:___0__min    There ex:__38___min  SciFit x5'   Seated LAQ with ball squeeze 2x10 R 3# P  Mini Squats 2 x 10 on airex 2x10 1 UE support  R stdg TKE 2 x 10 purple   Step ups 6\" step 2x10 R leading Fwd/Lateral   Standing heel raises 2x10 Bilat   Slantboard 1'    Standing L hip abd,hip ext 2 x 10 on Airex one UE " "support  UNI LP 2x10 55# N  Seated HSC with blue band 2 x 10 (manual today)  Heel slide with strap 10 x 5 count hold  Seated hip abduction 2x10 purple Tband X  Seated marches 2x10  blue Tband (X)  Seated HS stretch 2 x 20\" X  IT band stretch with strap x5 10\" hold (no time) X  L 1/2 heel lift application (X)     Manual Therapy:   STM to R distal IT band, quad, HS X  Cupping to R IT band static and dynamic (X)     OP EDUCATION:  Access Code: [email or web address]  URL: [email or web address]  Date: 01/27/2025  Prepared by: Chante Ramirez     Exercises  - Seated Long Arc Quad with Hip Adduction  - 1 x daily - 7 x weekly - 2 sets - 10 reps  - Seated Hip Abduction with Resistance  - 1 x daily - 7 x weekly - 2 sets - 10 reps  - Seated March with Resistance  - 1 x daily - 7 x weekly - 2 sets - 10 reps  - Seated Hamstring Stretch  - 1 x daily - 7 x weekly - 1 sets - 3 reps - 20-30 hold  - Supine ITB Stretch with Strap  - 1 x daily - 7 x weekly - 1 sets - 10 reps - 10 hold*   Heel lift  Access Code: [email or web address]  URL: [email or web address]  Date: 01/20/2025  Prepared by: Vj Hodges            Goals:  Active       PT Problem       PT Goal 1       Start:  01/20/25    Expected End:  04/20/25       1. Independent HEP to allow for 50% reduction in max ADL C/C sx ( 2/10) 2-3wks  2. Survey score improvement from 55/80 to 65/80 (LEFS) 3-4wks  3. ROM increase to allow for improved ADL stairs (from 5-110 to neut to 120 R knee) 3-4wks           PT Goal 2       Start:  01/20/25    Expected End:  04/20/25       1. \"I want my  knee  to feel better\".             "

## 2025-03-19 ENCOUNTER — TREATMENT (OUTPATIENT)
Dept: PHYSICAL THERAPY | Facility: CLINIC | Age: 70
End: 2025-03-19
Payer: MEDICARE

## 2025-03-19 DIAGNOSIS — S83.91XA SPRAIN OF UNSPECIFIED SITE OF RIGHT KNEE, INITIAL ENCOUNTER: ICD-10-CM

## 2025-03-19 PROCEDURE — 97110 THERAPEUTIC EXERCISES: CPT | Mod: GP,CQ

## 2025-03-19 ASSESSMENT — PAIN - FUNCTIONAL ASSESSMENT: PAIN_FUNCTIONAL_ASSESSMENT: 0-10

## 2025-03-19 ASSESSMENT — PAIN SCALES - GENERAL: PAINLEVEL_OUTOF10: 1

## 2025-03-19 ASSESSMENT — PAIN DESCRIPTION - DESCRIPTORS: DESCRIPTORS: TIGHTNESS

## 2025-03-19 NOTE — PROGRESS NOTES
Physical Therapy Treatment    Patient Name: Stefany Mullen  MRN: [id number]  : 1955  David Echeverria  Today's Date: 3/19/2025  Time Calculation  Start Time: 830  Stop Time: 915  Time Calculation (min): 45 min  PT Therapeutic Procedures Time Entry  Therapeutic Exercise Time Entry: 43         Assessment:   Patient identified by name & .  Treatment consisted of ex's for right knee strength, stability an ROM.   Patient with poor form of squats and kept taking his knees over his toes when squatting, causing knee pain. Patient able to perform all other ex's with good form.   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   OP PT Plan  Treatment/Interventions: Aquatic therapy, Cryotherapy, Education/ Instruction, Electrical stimulation, Gait training, Hot pack, Manual therapy, Self care/ home management, Therapeutic exercises, Other (comment) (IASTM/cupping)  PT Plan: Skilled PT  PT Frequency: 2 times per week  Duration: 4wks  Onset Date: 10/09/24  Certification Period Start Date: 25  Certification Period End Date: 06/10/25  Rehab Potential: Fair (previous rehab series)  Plan of Care Agreement: Patient    Current Problem  Problem List Items Addressed This Visit             ICD-10-CM    Sprain of unspecified site of right knee, initial encounter S83.91XA       Subjective   Patient currently does not have a HEP from his current PT evaluation and had to stop all ex's from ending his sessions in February. States he does not describe his symptoms as pain, just some tightness and a little sore. States that he was up and down a lot of steps yesterday and that is why he is a little sore.   General  Reason for Referral: R knee sprain  Referred By: Juwan  General Comment: 3/9  Visit #8  Precautions  Precautions  Precautions Comment: R/L TKR (last in ); R shldr disloc 3wks ago    Pain  Pain Assessment: 0-10  0-10 (Numeric) Pain  "Score: 1  Pain Location: Knee  Pain Orientation: Right  Pain Descriptors: Tightness    Objective:   124  Treatments:  There ex: x43'  SciFit Stepper, seat 23, LE's only x5'   Slantboard 1'    Step ups 6\" step 2x10 R leading Fwd/Lateral   Standing heel raises 2x10 Bilat   Standing L hip abd,hip ext 2 x 10 on Airex one UE support  Mini Squats on airex 2x10 1 UE support (Change to wall squats next treatment)  UNI LP 2x10 55#  R stdg TKE 2 x 10 purple   Seated LAQ with ball squeeze 2x10 R 3#   Seated HSC with blue band 2 x 10  Heel slide with strap 10 x 5 count hold  Seated hip abduction 2x10 purple Tband X  Seated marches 2x10  blue Tband (X)  Seated HS stretch 2 x 20\" X  IT band stretch with strap x5 10\" hold (no time) X  L 1/2 heel lift application (X)     Manual Therapy: X  STM to R distal IT band, quad, HS X  Cupping to R IT band static and dynamic (X)     OP EDUCATION:      Goals:  Active       PT Problem       PT Goal 1       Start:  01/20/25    Expected End:  04/20/25       1. Independent HEP to allow for 50% reduction in max ADL C/C sx ( 2/10) 2-3wks  2. Survey score improvement from 55/80 to 65/80 (LEFS) 3-4wks  3. ROM increase to allow for improved ADL stairs (from 5-110 to neut to 120 R knee) 3-4wks           PT Goal 2       Start:  01/20/25    Expected End:  04/20/25       1. \"I want my  knee  to feel better\".             "

## 2025-03-24 ENCOUNTER — TREATMENT (OUTPATIENT)
Dept: PHYSICAL THERAPY | Facility: CLINIC | Age: 70
End: 2025-03-24
Payer: MEDICARE

## 2025-03-24 DIAGNOSIS — S83.91XA SPRAIN OF UNSPECIFIED SITE OF RIGHT KNEE, INITIAL ENCOUNTER: ICD-10-CM

## 2025-03-24 PROCEDURE — 97110 THERAPEUTIC EXERCISES: CPT | Mod: GP,CQ

## 2025-03-24 ASSESSMENT — PAIN - FUNCTIONAL ASSESSMENT: PAIN_FUNCTIONAL_ASSESSMENT: 0-10

## 2025-03-24 ASSESSMENT — PAIN DESCRIPTION - DESCRIPTORS: DESCRIPTORS: TIGHTNESS

## 2025-03-24 ASSESSMENT — PAIN SCALES - GENERAL: PAINLEVEL_OUTOF10: 1

## 2025-03-24 NOTE — PROGRESS NOTES
"Physical Therapy Treatment    Patient Name: Stefany Mullen  MRN: [id number]  Today's Date: 3/24/2025  Time Calculation  Start Time: 0913  Stop Time: 0952  Time Calculation (min): 39 min  PT Therapeutic Procedures Time Entry  Therapeutic Exercise Time Entry: 38         Current Problem  Problem List Items Addressed This Visit             ICD-10-CM    Sprain of unspecified site of right knee, initial encounter S83.91XA       Subjective   Pt.'s name and birthday confirmed.  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  Pt. With 1/10 pain with right knee.  A little soreness, states he was climbing up a ladder over the weekend.  No reports of falls.    Reason for Referral: R knee sprain  Referred By: Juwan  General Comment: 4/9      Precautions  Precautions  Precautions Comment: R/L TKR (last in 2020); R shldr disloc 3wks ago      Pain  Pain Assessment: 0-10  0-10 (Numeric) Pain Score: 1  Pain Location: Knee  Pain Orientation: Right  Pain Descriptors: Tightness    Objective:  R knee flexion @ 123 degrees    Treatments:  SciFit Stepper, seat 23, LE's only x6'  (P time)  Slantboard 1'    Step ups 6\" step 2x10 R leading Fwd/Lateral   Standing heel raises 2x10 Bilat   Standing L hip abd,hip ext 2 x 10 on Airex one UE support  Mini Squats on airex 2x10 1 UE support (Change to wall squats next treatment)  UNI LP 2x10 55#  R stdg TKE 2 x 10 purple   Seated LAQ with ball squeeze 2x10 R 3#   Seated HSC with blue band 2 x 10  Heel slide with strap 10 x 5 count hold+  Seated hip abduction 2x10 purple Tband X  Seated marches 2x10  blue Tband (X)  Seated HS stretch 2 x 20\" X  IT band stretch with strap x5 10\" hold (no time) X  L 1/2 heel lift application (X)  R SLS stance x3  30\" ea (N)  HS Stretch R x3  30\" ea (N)     Manual Therapy: X  STM to R distal IT band, quad, HS X  Cupping to R IT band static and dynamic (X)           OP EDUCATION:  Access Code: [email or web address]  URL: " "[email or web address]  Date: 01/27/2025  Prepared by: Chante Ramirez     Exercises  - Seated Long Arc Quad with Hip Adduction  - 1 x daily - 7 x weekly - 2 sets - 10 reps  - Seated Hip Abduction with Resistance  - 1 x daily - 7 x weekly - 2 sets - 10 reps  - Seated March with Resistance  - 1 x daily - 7 x weekly - 2 sets - 10 reps  - Seated Hamstring Stretch  - 1 x daily - 7 x weekly - 1 sets - 3 reps - 20-30 hold  - Supine ITB Stretch with Strap  - 1 x daily - 7 x weekly - 1 sets - 10 reps - 10 hold*   Heel lift  Access Code: [email or web address]  URL: [email or web address]  Date: 01/20/2025  Prepared by: Vj Hodges           Assessment:  Pt. With good tolerance with TE this date.  Decreased UE support needed with standing activities.  Added SLS and hamstring stretches which pt. Denies increased pain.         Plan:  Continue with strengthening, ROM and flexibility per tolerance to improve daily activities/work duties with little to no difficulty.  MB       OP PT Plan  Treatment/Interventions: Aquatic therapy, Cryotherapy, Education/ Instruction, Electrical stimulation, Gait training, Hot pack, Manual therapy, Self care/ home management, Therapeutic exercises, Other (comment) (IASTM/cupping)  PT Plan: Skilled PT  PT Frequency: 2 times per week  Duration: 4wks  Onset Date: 10/09/24  Certification Period Start Date: 03/12/25  Certification Period End Date: 06/10/25  Rehab Potential: Fair (previous rehab series)  Plan of Care Agreement: Patient              Goals:  Active       PT Problem       PT Goal 1       Start:  01/20/25    Expected End:  04/20/25       1. Independent HEP to allow for 50% reduction in max ADL C/C sx ( 2/10) 2-3wks  2. Survey score improvement from 55/80 to 65/80 (LEFS) 3-4wks  3. ROM increase to allow for improved ADL stairs (from 5-110 to neut to 120 R knee) 3-4wks           PT Goal 2       Start:  01/20/25    Expected End:  04/20/25       1. \"I want my  " "knee  to feel better\".             "

## 2025-03-26 ENCOUNTER — TREATMENT (OUTPATIENT)
Dept: PHYSICAL THERAPY | Facility: CLINIC | Age: 70
End: 2025-03-26
Payer: MEDICARE

## 2025-03-26 DIAGNOSIS — S83.91XA SPRAIN OF UNSPECIFIED SITE OF RIGHT KNEE, INITIAL ENCOUNTER: ICD-10-CM

## 2025-03-26 PROCEDURE — 97110 THERAPEUTIC EXERCISES: CPT | Mod: GP

## 2025-03-26 ASSESSMENT — PAIN - FUNCTIONAL ASSESSMENT: PAIN_FUNCTIONAL_ASSESSMENT: 0-10

## 2025-03-26 ASSESSMENT — PAIN SCALES - GENERAL: PAINLEVEL_OUTOF10: 0 - NO PAIN

## 2025-03-26 NOTE — PROGRESS NOTES
"Physical Therapy Treatment    Patient Name: Stefany Mullen  MRN: [id number]  : 1955  Today's Date: 3/26/2025  David Echeverria  Time Calculation  Start Time: 845  Stop Time: 925  Time Calculation (min): 40 min      PT Therapeutic Procedures Time Entry  Therapeutic Exercise Time Entry: 39                         General:  General  Reason for Referral: R knee sprain  Referred By: Juwan  General Comment:   Visit #10    Current Problem  Problem List Items Addressed This Visit             ICD-10-CM    Sprain of unspecified site of right knee, initial encounter S83.91XA         Assessment:Patient identity confirmed today with name/. ;  ( 0 ) falls since last clinic visit;  no C/O pain with full clinic exercise program today      Plan:  Treatment/Interventions: Aquatic therapy, Cryotherapy, Education/ Instruction, Electrical stimulation, Gait training, Hot pack, Manual therapy, Self care/ home management, Therapeutic exercises, Other (comment) (IASTM/cupping)  PT Plan: Skilled PT  PT Frequency: 2 times per week  Duration: 4wks  Onset Date: 10/09/24  Certification Period Start Date: 25  Certification Period End Date: 06/10/25  Rehab Potential: Fair (previous rehab series)  Plan of Care Agreement: Patient  Continue with clinic rehab treatments toward functional goals ( gait)    Subjective: I have  0 /10 pain right now.         Precautions  Precautions  Precautions Comment: R/L TKR (last in ); R shldr disloc 3wks ago      Pain  Pain Assessment: 0-10  0-10 (Numeric) Pain Score: 0 - No pain  Pain Location: Knee  Pain Orientation: Right    Objective    Manual:___0__min    There ex:__39___min  SciFit Stepper, seat 23, LE's only x6'    Slantboard 1'    Step ups 6\" step 2x10 R leading Fwd/Lateral   Standing heel raises 2x10 Bilat   Standing L hip abd,hip ext 2 x 10 on Airex one UE support  Mini Squats on airex 2x10 1 UE support (Change to wall squats next treatment)  UNI LP 2x10 55#  R stdg TKE 2 x 10 purple " "  Seated LAQ with ball squeeze 2x10 R 3#   Seated HSC with blue band 2 x 10 (manual today)  Heel slide with strap 10 x 5 count hold+  R SLS stance x3  30\" ea   HS Stretch R x3  30\" ea   Seated hip abduction 2x10 purple Tband X  Seated marches 2x10  blue Tband (X)  Seated HS stretch 2 x 20\" X  IT band stretch with strap x5 10\" hold (no time) X  L 1/2 heel lift application (X)       Manual Therapy: X  STM to R distal IT band, quad, HS X  Cupping to R IT band static and dynamic (X)               OP EDUCATION:  Access Code: [email or web address]  URL: [email or web address]  Date: 01/27/2025  Prepared by: Chante Ramirez     Exercises  - Seated Long Arc Quad with Hip Adduction  - 1 x daily - 7 x weekly - 2 sets - 10 reps  - Seated Hip Abduction with Resistance  - 1 x daily - 7 x weekly - 2 sets - 10 reps  - Seated March with Resistance  - 1 x daily - 7 x weekly - 2 sets - 10 reps  - Seated Hamstring Stretch  - 1 x daily - 7 x weekly - 1 sets - 3 reps - 20-30 hold  - Supine ITB Stretch with Strap  - 1 x daily - 7 x weekly - 1 sets - 10 reps - 10 hold*   Heel lift  Access Code: [email or web address]  URL: [email or web address]  Date: 01/20/2025  Prepared by: Vj Hodges               Assessment:  Pt. With good tolerance with TE this date.  Decreased UE support needed with standing activities.  Added SLS and hamstring stretches which pt. Denies increased pain.        Plan:  Continue with strengthening, ROM and flexibility per tolerance to improve daily activities/work duties with little to no difficulty.  MB         OP PT Plan  Treatment/Interventions: Aquatic therapy, Cryotherapy, Education/ Instruction, Electrical stimulation, Gait training, Hot pack, Manual therapy, Self care/ home management, Therapeutic exercises, Other (comment) (IASTM/cupping)  PT Plan: Skilled PT  PT Frequency: 2 times per week  Duration: 4wks  Onset Date: 10/09/24  Certification Period Start Date: 03/12/25  Certification " "Period End Date: 06/10/25  Rehab Potential: Fair (previous rehab series)  Plan of Care Agreement: Patient               OP EDUCATION:      Goals:  Active       PT Problem       PT Goal 1       Start:  01/20/25    Expected End:  04/20/25       1. Independent HEP to allow for 50% reduction in max ADL C/C sx ( 2/10) 2-3wks  2. Survey score improvement from 55/80 to 65/80 (LEFS) 3-4wks  3. ROM increase to allow for improved ADL stairs (from 5-110 to neut to 120 R knee) 3-4wks           PT Goal 2       Start:  01/20/25    Expected End:  04/20/25       1. \"I want my  knee  to feel better\".             "

## 2025-03-31 ENCOUNTER — TREATMENT (OUTPATIENT)
Dept: PHYSICAL THERAPY | Facility: CLINIC | Age: 70
End: 2025-03-31
Payer: MEDICARE

## 2025-03-31 DIAGNOSIS — S83.91XA SPRAIN OF UNSPECIFIED SITE OF RIGHT KNEE, INITIAL ENCOUNTER: ICD-10-CM

## 2025-03-31 PROCEDURE — 97110 THERAPEUTIC EXERCISES: CPT | Mod: GP,CQ

## 2025-03-31 ASSESSMENT — PAIN SCALES - GENERAL: PAINLEVEL_OUTOF10: 3

## 2025-03-31 ASSESSMENT — PAIN DESCRIPTION - DESCRIPTORS: DESCRIPTORS: DISCOMFORT

## 2025-03-31 ASSESSMENT — PAIN - FUNCTIONAL ASSESSMENT: PAIN_FUNCTIONAL_ASSESSMENT: 0-10

## 2025-03-31 NOTE — PROGRESS NOTES
Physical Therapy Treatment    Patient Name: Stefany Mullen  MRN: [id number]  : 1955  David Echeverria  Today's Date: 3/31/2025  Time Calculation  Start Time: 833  Stop Time: 919  Time Calculation (min): 46 min  PT Therapeutic Procedures Time Entry  Therapeutic Exercise Time Entry: 44         Assessment:   Patient identified by name & .  Treatment consisted of ex's for right knee ROM, strength and stability. Patient able to complete all ex's with minimal difficulty. Did report improved ease of motion after doing ex's today. Pain level the same pre and post treatment.     Plan:  Progress ex's during treatment as able for improved stability of right knee for ease of yard work. -MA   OP PT Plan  Treatment/Interventions: Aquatic therapy, Cryotherapy, Education/ Instruction, Electrical stimulation, Gait training, Hot pack, Manual therapy, Self care/ home management, Therapeutic exercises, Other (comment) (IASTM/cupping)  PT Plan: Skilled PT  PT Frequency: 2 times per week  Duration: 4wks  Onset Date: 10/09/24  Certification Period Start Date: 25  Certification Period End Date: 06/10/25  Rehab Potential: Fair (previous rehab series)  Plan of Care Agreement: Patient    Current Problem  Problem List Items Addressed This Visit             ICD-10-CM    Sprain of unspecified site of right knee, initial encounter S83.91XA       Subjective     Reports he did a lot over the weekend and thinks he may have overdid it a bit. States his right knee is feeling better today compared to yesterday.   General  Reason for Referral: R knee sprain  Referred By: Juwan  General Comment:   Visit #11  Precautions  Precautions  Precautions Comment: R/L TKR (last in ); R shldr disloc 3wks ago    Pain  Pain Assessment: 0-10  0-10 (Numeric) Pain Score: 3  Pain Location: Knee  Pain Orientation: Right  Pain Descriptors: Discomfort    Objective:   128* AAROM flexion of right knee   Treatments:  There ex: x44' min  SciFit Stepper,  "seat 23, LE's only x6'    Slantboard 1'    Step ups 6\" step 2x10 R leading Fwd/Lateral   Standing heel raises 2x10 Bilat   Standing L hip abd,hip ext 2 x 10 on Airex B UE support  Wall Squats 2x10   UNI LP 2x10 55#  R stdg TKE 2 x 10 purple   Seated LAQ with ball squeeze 2x10 R 3#   Seated HSC with blue band 2 x 10 (manual today)  Heel slide with strap 10 x 5 count hold  R SLS stance x3  30\" ea   HS Stretch R x3  30\" ea (X)  Seated hip abduction 2x10 purple Tband X  Seated marches 2x10  blue Tband (X)  Seated HS stretch 2 x 20\" X  IT band stretch with strap x5 10\" hold (no time) X  L 1/2 heel lift application (X)      Manual Therapy: X  STM to R distal IT band, quad, HS X  Cupping to R IT band static and dynamic (X)               OP EDUCATION:  Access Code: [email or web address]  URL: [email or web address]  Date: 01/27/2025  Prepared by: Chante Ramirez     Exercises  - Seated Long Arc Quad with Hip Adduction  - 1 x daily - 7 x weekly - 2 sets - 10 reps  - Seated Hip Abduction with Resistance  - 1 x daily - 7 x weekly - 2 sets - 10 reps  - Seated March with Resistance  - 1 x daily - 7 x weekly - 2 sets - 10 reps  - Seated Hamstring Stretch  - 1 x daily - 7 x weekly - 1 sets - 3 reps - 20-30 hold  - Supine ITB Stretch with Strap  - 1 x daily - 7 x weekly - 1 sets - 10 reps - 10 hold*   Heel lift  Access Code: [email or web address]  URL: [email or web address]  Date: 01/20/2025  Prepared by: Vj Hodges        Goals:  Active       PT Problem       PT Goal 1       Start:  01/20/25    Expected End:  04/20/25       1. Independent HEP to allow for 50% reduction in max ADL C/C sx ( 2/10) 2-3wks  2. Survey score improvement from 55/80 to 65/80 (LEFS) 3-4wks  3. ROM increase to allow for improved ADL stairs (from 5-110 to neut to 120 R knee) 3-4wks           PT Goal 2       Start:  01/20/25    Expected End:  04/20/25       1. \"I want my  knee  to feel better\".             "

## 2025-04-02 ENCOUNTER — TREATMENT (OUTPATIENT)
Dept: PHYSICAL THERAPY | Facility: CLINIC | Age: 70
End: 2025-04-02
Payer: MEDICARE

## 2025-04-02 DIAGNOSIS — S83.91XA SPRAIN OF UNSPECIFIED SITE OF RIGHT KNEE, INITIAL ENCOUNTER: ICD-10-CM

## 2025-04-02 PROCEDURE — 97110 THERAPEUTIC EXERCISES: CPT | Mod: GP

## 2025-04-02 ASSESSMENT — PAIN - FUNCTIONAL ASSESSMENT: PAIN_FUNCTIONAL_ASSESSMENT: 0-10

## 2025-04-02 ASSESSMENT — PAIN SCALES - GENERAL: PAINLEVEL_OUTOF10: 0 - NO PAIN

## 2025-04-02 NOTE — PROGRESS NOTES
"Physical Therapy Treatment    Patient Name: Stefany Mullen  MRN: [id number]  : 1955  Today's Date: 2025  David Echeverria  Time Calculation  Start Time: 851  Stop Time: 931  Time Calculation (min): 40 min      PT Therapeutic Procedures Time Entry  Therapeutic Exercise Time Entry: 38                         General:  General  Reason for Referral: R knee sprain  Referred By: Juwan  General Comment:   Visit #12    Current Problem  Problem List Items Addressed This Visit             ICD-10-CM    Sprain of unspecified site of right knee, initial encounter S83.91XA         Assessment:Patient identity confirmed today with name/. ;  ( 0 ) falls since last clinic visit;  no pain with exercises today      Plan:  Treatment/Interventions: Aquatic therapy, Cryotherapy, Education/ Instruction, Electrical stimulation, Gait training, Hot pack, Manual therapy, Self care/ home management, Therapeutic exercises, Other (comment) (IASTM/cupping)  PT Plan: Skilled PT  PT Frequency: 2 times per week  Duration: 4wks  Onset Date: 10/09/24  Certification Period Start Date: 25  Certification Period End Date: 06/10/25  Rehab Potential: Fair (previous rehab series)  Plan of Care Agreement: Patient  Continue with clinic rehab treatments toward functional goals ( gait)    Subjective: I have   0/10 pain right now.   I was standing at home for some time without knee irritation.      Precautions  Precautions  Precautions Comment: R/L TKR (last in ); R shldr disloc 3wks ago      Pain  Pain Assessment: 0-10  0-10 (Numeric) Pain Score: 0 - No pain  Pain Location: Knee  Pain Orientation: Right    Objective    Manual:__0___min    There ex:___38__min  SciFit Stepper, seat 23, LE's only x6'    Slantboard 1'    Step ups 6\" step 2x10 R leading Fwd/Lateral   Standing heel raises 2x10 Bilat   Standing L hip abd,hip ext 2 x 10 on Airex B UE support  Wall Squats 2x10   UNI LP 2x10 55#  R stdg TKE 2 x 10 purple   Seated LAQ with ball " "squeeze 2x10 R 3#   Seated HSC with blue band 2 x 10 (manual today)  Heel slide with strap 10 x 5 count hold  R SLS stance x3  30\" ea   HS Stretch R x3  30\" ea   Cord walk x5 fwd/bwd/S-S x1 cord N  Seated hip abduction 2x10 purple Tband X  Seated marches 2x10  blue Tband (X)  Seated HS stretch 2 x 20\" X  IT band stretch with strap x5 10\" hold (no time) X  L 1/2 heel lift application (X)        Manual Therapy: X  STM to R distal IT band, quad, HS X  Cupping to R IT band static and dynamic (X)               OP EDUCATION:  Access Code: [email or web address]  URL: [email or web address]  Date: 01/27/2025  Prepared by: Chante Ramirez     Exercises  - Seated Long Arc Quad with Hip Adduction  - 1 x daily - 7 x weekly - 2 sets - 10 reps  - Seated Hip Abduction with Resistance  - 1 x daily - 7 x weekly - 2 sets - 10 reps  - Seated March with Resistance  - 1 x daily - 7 x weekly - 2 sets - 10 reps  - Seated Hamstring Stretch  - 1 x daily - 7 x weekly - 1 sets - 3 reps - 20-30 hold  - Supine ITB Stretch with Strap  - 1 x daily - 7 x weekly - 1 sets - 10 reps - 10 hold*   Heel lift  Access Code: [email or web address]  URL: [email or web address]  Date: 01/20/2025  Prepared by: Vj Hodges              Goals:  Active       PT Problem       PT Goal 1       Start:  01/20/25    Expected End:  04/20/25       1. Independent HEP to allow for 50% reduction in max ADL C/C sx ( 2/10) 2-3wks  2. Survey score improvement from 55/80 to 65/80 (LEFS) 3-4wks  3. ROM increase to allow for improved ADL stairs (from 5-110 to neut to 120 R knee) 3-4wks           PT Goal 2       Start:  01/20/25    Expected End:  04/20/25       1. \"I want my  knee  to feel better\".             "

## 2025-04-07 ENCOUNTER — TREATMENT (OUTPATIENT)
Dept: PHYSICAL THERAPY | Facility: CLINIC | Age: 70
End: 2025-04-07
Payer: MEDICARE

## 2025-04-07 DIAGNOSIS — S83.91XA SPRAIN OF UNSPECIFIED SITE OF RIGHT KNEE, INITIAL ENCOUNTER: ICD-10-CM

## 2025-04-07 PROCEDURE — 97110 THERAPEUTIC EXERCISES: CPT | Mod: GP

## 2025-04-07 ASSESSMENT — PAIN - FUNCTIONAL ASSESSMENT: PAIN_FUNCTIONAL_ASSESSMENT: 0-10

## 2025-04-07 ASSESSMENT — PAIN SCALES - GENERAL: PAINLEVEL_OUTOF10: 0 - NO PAIN

## 2025-04-07 NOTE — Clinical Note
2025    David Echeverria MD  [address] [facility] [address]    Patient: Stefany Mullen   YOB: 1955   Date of Visit: 2025       Dear David Echeverria MD  [address] [facility] [address]    The attached plan of care is being sent to you because your patient’s medical reimbursement requires that you certify the plan of care. Your signature is required to allow uninterrupted insurance coverage.      You may indicate your approval by signing below and faxing this form back to us at Dept Fax: [phone].    Please call Dept: [phone] with any questions or concerns.    Thank you for this referral,        Americo Hodges, PT  [address] [address]    Payer: Payor: MEDICARE / Plan: MEDICARE PART A AND B / Product Type: *No Product type* /                                                                         Date:     Dear Americo Hodges, PT,     Re: Mr. Stefany Mullen, MRN:[id number]    I certify that I have reviewed the attached plan of care and it is medically necessary for Mr. Stefany Mullen (1955) who is under my care.          ______________________________________                    _________________  Provider name and credentials                                           Date and time                                                                                           Plan of Care 25   Effective from: 2025  Effective to: 2025    Plan ID: [id number]            Participants as of Finalize on 2025    Name Type Comments Contact Info    David Echeverria MD Referring Provider  [phone]    Americo Hodges PT Physical Therapist  [phone]       Last Plan Note     Author: Americo Hodges PT Status: Signed Last edited: 2025  8:30 AM       Physical Therapy Treatment    Patient Name: Stefany Mullen  MRN: [id number]  : 1955  Today's Date: 2025  David Echeverria  "Calculation  Start Time: 836  Stop Time: 0850  Time Calculation (min): 14 min      PT Therapeutic Procedures Time Entry  Therapeutic Exercise Time Entry: 14                         General:  General  Reason for Referral: R knee sprain  Referred By: Juwan  General Comment:   Visit #13    Current Problem  Problem List Items Addressed This Visit             ICD-10-CM    Sprain of unspecified site of right knee, initial encounter S83.91XA         Assessment:Patient identity confirmed today with name/. ;  ( 0 ) falls since last clinic visit; see goals; modified/added to clinic/HEP today;       Plan:  This patient agrees to discharge to ongoing HEP and home management with ( good ) progress achieved.       Subjective: I have  0 /10 pain right now.         Precautions  Precautions  Precautions Comment: R/L TKR (last in ); R shldr disloc 3wks ago      Pain  Pain Assessment: 0-10  0-10 (Numeric) Pain Score: 0 - No pain  Pain Location: Knee  Pain Orientation: Right    Objective  See goals; good ADL tolerance gains;  also good AROM gains      There ex:_____min  Goals reviewed, surveyed and/or updated  Reviewed and/or modified ongoing HEP today.  NOT TODAY  SciFit Stepper, seat 23, LE's only x6'    Slantboard 1'    Step ups 6\" step 2x10 R leading Fwd/Lateral   Standing heel raises 2x10 Bilat   Standing L hip abd,hip ext 2 x 10 on Airex B UE support  Wall Squats 2x10   UNI LP 2x10 55#  R stdg TKE 2 x 10 purple   Seated LAQ with ball squeeze 2x10 R 3#   Seated HSC with blue band 2 x 10 (manual today)  Heel slide with strap 10 x 5 count hold  R SLS stance x3  30\" ea   HS Stretch R x3  30\" ea   Cord walk x5 fwd/bwd/S-S x1 cord N  Seated hip abduction 2x10 purple Tband X  Seated marches 2x10  blue Tband (X)  Seated HS stretch 2 x 20\" X  IT band stretch with strap x5 10\" hold (no time) X  L 1/2 heel lift application (X)        Manual Therapy: X  STM to R distal IT band, quad, HS X  Cupping to R IT band static and dynamic " "(X)               OP EDUCATION:  Access Code: [email or web address]  URL: [email or web address]  Date: 01/27/2025  Prepared by: Chante Ramirez     Exercises  - Seated Long Arc Quad with Hip Adduction  - 1 x daily - 7 x weekly - 2 sets - 10 reps  - Seated Hip Abduction with Resistance  - 1 x daily - 7 x weekly - 2 sets - 10 reps  - Seated March with Resistance  - 1 x daily - 7 x weekly - 2 sets - 10 reps  - Seated Hamstring Stretch  - 1 x daily - 7 x weekly - 1 sets - 3 reps - 20-30 hold  - Supine ITB Stretch with Strap  - 1 x daily - 7 x weekly - 1 sets - 10 reps - 10 hold*   Heel lift  Access Code: [email or web address]  URL: [email or web address]  Date: 01/20/2025  Prepared by: Vj Hodges                 OP EDUCATION:  Access Code: [email or web address]  URL: [email or web address]  Date: 04/07/2025  Prepared by: Vj Hodges    Exercises  - Sitting Heel Slide with Towel   - Standing Terminal Knee Extension with Resistance   - Step Up (Mirrored)   - Lateral Step Ups     Goals:  Active       PT Problem       PT Goal 1       Start:  01/20/25    Expected End:  04/20/25       1. Independent HEP to allow for 50% reduction in max ADL C/C sx ( 2/10) 2-3wks 2/10 max sx within the last 5 days; 4/7/25; NOT MET  2. Survey score improvement from 55/80 to 65/80 (LEFS) 3-4wks 59/80 as of 4/7/25; PARTIALLY MET   3. ROM increase to allow for improved ADL stairs (from 5-110 to neut to 120 R knee) 3-4wks notes improved ADL stairs; 120 active flexion; 4/7/25; MET           PT Goal 2       Start:  01/20/25    Expected End:  04/20/25       1. \"I want my  knee  to feel better\". \"My knee is a lot better\".: 4/7/25; MET                   Current Participants as of 4/7/2025    Name Type Comments Contact Info    David Echeverria MD Referring Provider  [phone]    Signature pending    Americo Hodges PT Physical Therapist  [phone]          "

## 2025-04-07 NOTE — PROGRESS NOTES
"Physical Therapy Treatment    Patient Name: Stefany Mullen  MRN: [id number]  : 1955  Today's Date: 2025  David Echeverria  Time Calculation  Start Time: 0836  Stop Time: 0850  Time Calculation (min): 14 min      PT Therapeutic Procedures Time Entry  Therapeutic Exercise Time Entry: 14                         General:  General  Reason for Referral: R knee sprain  Referred By: Juwan  General Comment:   Visit #13    Current Problem  Problem List Items Addressed This Visit             ICD-10-CM    Sprain of unspecified site of right knee, initial encounter S83.91XA         Assessment:Patient identity confirmed today with name/. ;  ( 0 ) falls since last clinic visit; see goals; modified/added to clinic/HEP today;       Plan:  This patient agrees to discharge to ongoing HEP and home management with ( good ) progress achieved.       Subjective: I have  0 /10 pain right now.         Precautions  Precautions  Precautions Comment: R/L TKR (last in ); R shldr disloc 3wks ago      Pain  Pain Assessment: 0-10  0-10 (Numeric) Pain Score: 0 - No pain  Pain Location: Knee  Pain Orientation: Right    Objective  See goals; good ADL tolerance gains;  also good AROM gains      There ex:_____min  Goals reviewed, surveyed and/or updated  Reviewed and/or modified ongoing HEP today.  NOT TODAY  SciFit Stepper, seat 23, LE's only x6'    Slantboard 1'    Step ups 6\" step 2x10 R leading Fwd/Lateral   Standing heel raises 2x10 Bilat   Standing L hip abd,hip ext 2 x 10 on Airex B UE support  Wall Squats 2x10   UNI LP 2x10 55#  R stdg TKE 2 x 10 purple   Seated LAQ with ball squeeze 2x10 R 3#   Seated HSC with blue band 2 x 10 (manual today)  Heel slide with strap 10 x 5 count hold  R SLS stance x3  30\" ea   HS Stretch R x3  30\" ea   Cord walk x5 fwd/bwd/S-S x1 cord N  Seated hip abduction 2x10 purple Tband X  Seated marches 2x10  blue Tband (X)  Seated HS stretch 2 x 20\" X  IT band stretch with strap x5 10\" hold (no time) " "X  L 1/2 heel lift application (X)        Manual Therapy: X  STM to R distal IT band, quad, HS X  Cupping to R IT band static and dynamic (X)               OP EDUCATION:  Access Code: [email or web address]  URL: [email or web address]  Date: 01/27/2025  Prepared by: Chante Ramirez     Exercises  - Seated Long Arc Quad with Hip Adduction  - 1 x daily - 7 x weekly - 2 sets - 10 reps  - Seated Hip Abduction with Resistance  - 1 x daily - 7 x weekly - 2 sets - 10 reps  - Seated March with Resistance  - 1 x daily - 7 x weekly - 2 sets - 10 reps  - Seated Hamstring Stretch  - 1 x daily - 7 x weekly - 1 sets - 3 reps - 20-30 hold  - Supine ITB Stretch with Strap  - 1 x daily - 7 x weekly - 1 sets - 10 reps - 10 hold*   Heel lift  Access Code: [email or web address]  URL: [email or web address]  Date: 01/20/2025  Prepared by: Vj Hodges                 OP EDUCATION:  Access Code: [email or web address]  URL: [email or web address]  Date: 04/07/2025  Prepared by: Vj Hodges    Exercises  - Sitting Heel Slide with Towel   - Standing Terminal Knee Extension with Resistance   - Step Up (Mirrored)   - Lateral Step Ups     Goals:  Active       PT Problem       PT Goal 1       Start:  01/20/25    Expected End:  04/20/25       1. Independent HEP to allow for 50% reduction in max ADL C/C sx ( 2/10) 2-3wks 2/10 max sx within the last 5 days; 4/7/25; NOT MET  2. Survey score improvement from 55/80 to 65/80 (LEFS) 3-4wks 59/80 as of 4/7/25; PARTIALLY MET   3. ROM increase to allow for improved ADL stairs (from 5-110 to neut to 120 R knee) 3-4wks notes improved ADL stairs; 120 active flexion; 4/7/25; MET           PT Goal 2       Start:  01/20/25    Expected End:  04/20/25       1. \"I want my  knee  to feel better\". \"My knee is a lot better\".: 4/7/25; MET            "

## 2025-04-09 ENCOUNTER — APPOINTMENT (OUTPATIENT)
Dept: PHYSICAL THERAPY | Facility: CLINIC | Age: 70
End: 2025-04-09
Payer: MEDICARE

## 2025-04-09 DIAGNOSIS — S83.91XA SPRAIN OF UNSPECIFIED SITE OF RIGHT KNEE, INITIAL ENCOUNTER: ICD-10-CM
